# Patient Record
Sex: FEMALE | Race: AMERICAN INDIAN OR ALASKA NATIVE | NOT HISPANIC OR LATINO | URBAN - METROPOLITAN AREA
[De-identification: names, ages, dates, MRNs, and addresses within clinical notes are randomized per-mention and may not be internally consistent; named-entity substitution may affect disease eponyms.]

---

## 2022-07-21 ENCOUNTER — OUTPATIENT (OUTPATIENT)
Dept: OUTPATIENT SERVICES | Facility: HOSPITAL | Age: 32
LOS: 1 days | Discharge: HOME | End: 2022-07-21

## 2022-07-21 VITALS
DIASTOLIC BLOOD PRESSURE: 74 MMHG | HEART RATE: 66 BPM | WEIGHT: 158.73 LBS | SYSTOLIC BLOOD PRESSURE: 119 MMHG | HEIGHT: 65 IN | RESPIRATION RATE: 18 BRPM | TEMPERATURE: 97 F | OXYGEN SATURATION: 100 %

## 2022-07-21 DIAGNOSIS — Z98.891 HISTORY OF UTERINE SCAR FROM PREVIOUS SURGERY: Chronic | ICD-10-CM

## 2022-07-21 DIAGNOSIS — K80.20 CALCULUS OF GALLBLADDER WITHOUT CHOLECYSTITIS WITHOUT OBSTRUCTION: ICD-10-CM

## 2022-07-21 DIAGNOSIS — Z98.890 OTHER SPECIFIED POSTPROCEDURAL STATES: Chronic | ICD-10-CM

## 2022-07-21 DIAGNOSIS — Z98.51 TUBAL LIGATION STATUS: Chronic | ICD-10-CM

## 2022-07-21 DIAGNOSIS — Z01.818 ENCOUNTER FOR OTHER PREPROCEDURAL EXAMINATION: ICD-10-CM

## 2022-07-21 LAB
ALBUMIN SERPL ELPH-MCNC: 4.6 G/DL — SIGNIFICANT CHANGE UP (ref 3.5–5.2)
ALP SERPL-CCNC: 92 U/L — SIGNIFICANT CHANGE UP (ref 30–115)
ALT FLD-CCNC: 10 U/L — SIGNIFICANT CHANGE UP (ref 0–41)
ANION GAP SERPL CALC-SCNC: 9 MMOL/L — SIGNIFICANT CHANGE UP (ref 7–14)
APTT BLD: 32.6 SEC — SIGNIFICANT CHANGE UP (ref 27–39.2)
AST SERPL-CCNC: 13 U/L — SIGNIFICANT CHANGE UP (ref 0–41)
BASOPHILS # BLD AUTO: 0.06 K/UL — SIGNIFICANT CHANGE UP (ref 0–0.2)
BASOPHILS NFR BLD AUTO: 0.6 % — SIGNIFICANT CHANGE UP (ref 0–1)
BILIRUB SERPL-MCNC: <0.2 MG/DL — SIGNIFICANT CHANGE UP (ref 0.2–1.2)
BUN SERPL-MCNC: 16 MG/DL — SIGNIFICANT CHANGE UP (ref 10–20)
CALCIUM SERPL-MCNC: 9.5 MG/DL — SIGNIFICANT CHANGE UP (ref 8.5–10.1)
CHLORIDE SERPL-SCNC: 99 MMOL/L — SIGNIFICANT CHANGE UP (ref 98–110)
CO2 SERPL-SCNC: 27 MMOL/L — SIGNIFICANT CHANGE UP (ref 17–32)
CREAT SERPL-MCNC: 0.7 MG/DL — SIGNIFICANT CHANGE UP (ref 0.7–1.5)
EGFR: 118 ML/MIN/1.73M2 — SIGNIFICANT CHANGE UP
EOSINOPHIL # BLD AUTO: 0.24 K/UL — SIGNIFICANT CHANGE UP (ref 0–0.7)
EOSINOPHIL NFR BLD AUTO: 2.2 % — SIGNIFICANT CHANGE UP (ref 0–8)
GLUCOSE SERPL-MCNC: 86 MG/DL — SIGNIFICANT CHANGE UP (ref 70–99)
HCT VFR BLD CALC: 38.8 % — SIGNIFICANT CHANGE UP (ref 37–47)
HGB BLD-MCNC: 12.5 G/DL — SIGNIFICANT CHANGE UP (ref 12–16)
IMM GRANULOCYTES NFR BLD AUTO: 0.4 % — HIGH (ref 0.1–0.3)
INR BLD: 1.21 RATIO — SIGNIFICANT CHANGE UP (ref 0.65–1.3)
LYMPHOCYTES # BLD AUTO: 3.28 K/UL — SIGNIFICANT CHANGE UP (ref 1.2–3.4)
LYMPHOCYTES # BLD AUTO: 30.6 % — SIGNIFICANT CHANGE UP (ref 20.5–51.1)
MCHC RBC-ENTMCNC: 26.6 PG — LOW (ref 27–31)
MCHC RBC-ENTMCNC: 32.2 G/DL — SIGNIFICANT CHANGE UP (ref 32–37)
MCV RBC AUTO: 82.6 FL — SIGNIFICANT CHANGE UP (ref 81–99)
MONOCYTES # BLD AUTO: 0.72 K/UL — HIGH (ref 0.1–0.6)
MONOCYTES NFR BLD AUTO: 6.7 % — SIGNIFICANT CHANGE UP (ref 1.7–9.3)
NEUTROPHILS # BLD AUTO: 6.39 K/UL — SIGNIFICANT CHANGE UP (ref 1.4–6.5)
NEUTROPHILS NFR BLD AUTO: 59.5 % — SIGNIFICANT CHANGE UP (ref 42.2–75.2)
NRBC # BLD: 0 /100 WBCS — SIGNIFICANT CHANGE UP (ref 0–0)
PLATELET # BLD AUTO: 250 K/UL — SIGNIFICANT CHANGE UP (ref 130–400)
POTASSIUM SERPL-MCNC: 4.6 MMOL/L — SIGNIFICANT CHANGE UP (ref 3.5–5)
POTASSIUM SERPL-SCNC: 4.6 MMOL/L — SIGNIFICANT CHANGE UP (ref 3.5–5)
PROT SERPL-MCNC: 7.8 G/DL — SIGNIFICANT CHANGE UP (ref 6–8)
PROTHROM AB SERPL-ACNC: 13.9 SEC — HIGH (ref 9.95–12.87)
RBC # BLD: 4.7 M/UL — SIGNIFICANT CHANGE UP (ref 4.2–5.4)
RBC # FLD: 13.8 % — SIGNIFICANT CHANGE UP (ref 11.5–14.5)
SODIUM SERPL-SCNC: 135 MMOL/L — SIGNIFICANT CHANGE UP (ref 135–146)
WBC # BLD: 10.73 K/UL — SIGNIFICANT CHANGE UP (ref 4.8–10.8)
WBC # FLD AUTO: 10.73 K/UL — SIGNIFICANT CHANGE UP (ref 4.8–10.8)

## 2022-07-21 NOTE — H&P PST ADULT - HISTORY OF PRESENT ILLNESS
Pt denies cp palp uri cough dysuria or sob. ET: 1-2  FOS- denies SOB . PT denies any open wounds, drainage or rashes. hx of covid  -denies recent cough fever or infection. aware to self quarantine preop. all instructions reviewed.    scheduled for 7/27/22 lap nancy possible  open. hx abdominal discomfort x  3 years . increasing abdominal burning/aching x last 6 months. pain scale 9/10 daily   denies pain meds. resolves at times with decreases eating &  increases abdominal pain with eating larger meals.       As per patient, this is their complete medical and surgical history, including medications both prescribed or over the counter.  Patient verbalized understanding of instructions and was given the opportunity to ask questions and have them answered.  Patient denies any signs or symptoms of COVID 19 and denies contact with known positive individuals.  They have an appointment for repeat COVID testing pre-procedure and acknowledge its time and place.  They were instructed to quarantine pre-procedure, practice exposure control measures, continue to self-monitor and report any concerns to their proceduralist.  Anesthesia Alert  NO--Difficult Airway  NO--History of neck surgery or radiation  NO--Limited ROM of neck  NO--History of Malignant hyperthermia  NO--Personal or family history of Pseudocholinesterase deficiency.  NO--Prior Anesthesia Complication  NO--Latex Allergy  NO--Loose teeth  NO--History of Rheumatoid Arthritis  NO--RAPHAEL  NO--Bleeding risk  NO--Other_____

## 2022-07-21 NOTE — H&P PST ADULT - NSICDXPASTSURGICALHX_GEN_ALL_CORE_FT
PAST SURGICAL HISTORY:  History of esophagogastroduodenoscopy (EGD)     S/P      S/P tubal ligation

## 2022-07-26 NOTE — ASU PATIENT PROFILE, ADULT - PAIN SCALE PREFERRED, PROFILE
Aleisha Smith  [id number]  1962  57 y.o.  female    Referring Provider: Caitlyn Lanza APRN    Reason for  Visit:  Here for follow up after cardiac testing after initial   visit for shortness of breath, chest pain and palpitations  preoperative cardiovascular clearance under general anesthesia for Dr Castro right peroneal nerve decompression  This was performed successfully and now recovering  Cardiac workup test results as below     Subjective         Feels same overall as during last visit  No new events or complaints since last visit   Overall the patient feels no major change from baseline symptoms   Similar symptoms as during last visit      Chest pain (fullness or tightness) with exertion and rest , moderate substernal, pressure like. Lasts up to 1 hour   Started 6 months ago  Occurs once or twice a week  No associated diaphoresis    No associated nausea  Radiation to left side of neck below left jaw bone   Precipitated with exertion and also when heart rates drop    Relieved with rest  Not positional    No change with intake of food or antacids  No change with breathing  Moderate associated dyspnea       Moderate persistent palpitations  Frequent palpitations, once day or two lasting for less than 1 minute  No associated symptoms of dizziness, weakness, chest pain,  shortness of breath    Often before sleeping, cough may help  Very stressed due to mother's illness  Mild pedal edema especially after long driving  Chronic spine and foot pain      History of present illness:  Aleisha Smith is a 57 y.o. yo female with history of Essential Hypertension  Hyperlipidemia   who presents today for   Chief Complaint   Patient presents with   • Post-op     3 mo f/u s/p right peroneal nerve decompression with Dr. Castro   • Slow Heart Rate     recent testing   • Shortness of Breath     exertional   .    History  Past Medical History:   Diagnosis Date   • Anxiety    • Hyperlipidemia    • Hypertension    ,    Past Surgical History:   Procedure Laterality Date   • BACK SURGERY     •  SECTION     • EYE SURGERY     • FOOT FUSION     • OTHER SURGICAL HISTORY      NERVE DECOMPRESSION 2019   • TUBAL ABDOMINAL LIGATION     ,   Family History   Problem Relation Age of Onset   • Breast cancer Paternal Grandmother    • Heart disease Mother    ,   Social History     Tobacco Use   • Smoking status: Never Smoker   • Smokeless tobacco: Never Used   Substance Use Topics   • Alcohol use: Yes     Frequency: Never     Comment: OCC   • Drug use: No   ,     Medications  Current Outpatient Medications   Medication Sig Dispense Refill   • ALPRAZolam (XANAX) 0.5 MG tablet Take 0.5 mg by mouth At Night As Needed.  0   • escitalopram (LEXAPRO) 20 MG tablet Take 20 mg by mouth Daily As Needed.  1   • gabapentin (NEURONTIN) 100 MG capsule Take 100 mg by mouth 2 (Two) Times a Day.  1   • hydrochlorothiazide (MICROZIDE) 12.5 MG capsule Take 1 capsule by mouth Daily. 90 capsule 3   • lisinopril (PRINIVIL,ZESTRIL) 10 MG tablet Take 10 mg by mouth Daily.  1   • Loratadine 10 MG capsule Take 10 mg by mouth.     • Melatonin 10 MG tablet Take  by mouth.     • meloxicam (MOBIC) 15 MG tablet Take 15 mg by mouth.     • rosuvastatin (CRESTOR) 10 MG tablet Take 10 mg by mouth Daily.  1   • tiZANidine (ZANAFLEX) 4 MG tablet TAKE 1/4 TO 1 TABLET BY MOUTH EVERY 6-8 HOURS  2     No current facility-administered medications for this visit.        Allergies:  Patient has no known allergies.    Review of Systems  Review of Systems   Constitution: Positive for malaise/fatigue.   HENT: Negative.    Eyes: Negative.    Cardiovascular: Positive for chest pain, dyspnea on exertion and palpitations. Negative for claudication, cyanosis, irregular heartbeat, leg swelling, near-syncope, orthopnea, paroxysmal nocturnal dyspnea and syncope.   Respiratory: Negative.    Endocrine: Negative.    Hematologic/Lymphatic: Negative.    Skin: Negative.    Musculoskeletal:  "Positive for arthritis, back pain and joint pain.   Gastrointestinal: Negative for anorexia.   Genitourinary: Negative.    Neurological: Positive for weakness.   Psychiatric/Behavioral: Negative.        Objective     Physical Exam:  /83   Pulse 69   Ht 162.6 cm (64\")   Wt 94.3 kg (208 lb)   SpO2 99%   BMI 35.70 kg/m²     Physical Exam   Constitutional: She appears well-developed.   HENT:   Head: Normocephalic.   Neck: Normal carotid pulses and no JVD present. No tracheal tenderness present. Carotid bruit is not present. No tracheal deviation and no edema present.   Cardiovascular: Regular rhythm, normal heart sounds and normal pulses.   Pulmonary/Chest: Effort normal. No stridor.   Abdominal: Soft. She exhibits no distension. There is no hepatosplenomegaly. There is no tenderness.   Neurological: She is alert. She has normal strength. No cranial nerve deficit or sensory deficit.   Skin: Skin is warm.   Psychiatric: She has a normal mood and affect. Her speech is normal and behavior is normal.       Results Review:      Aleisha Smith   Holter monitor - 48 hour   Order# [id number]   Reading physician: Willian Mas MD Ordering physician: Willian Mas MD Study date: 19   Patient Information     Patient Name  Aleisha Smith MRN  [id number] Sex  Female  (Age)  1962 (57 y.o.)   Interpretation Summary     · Monitored for ~2 days  · The predominant rhythm noted during the testing period was sinus rhythm.  ·  7 premature ventricular contractions: PVC burden:  <0.1%   · 66  atrial premature contractions:  APC burden: <0.1%             · 4 runs of supraventricular tachycardia longest 8 beats at a maximum rate of 125 bpm  · No significant  ventricular tachy or alvin arrhythmia.  · No correlated arrhythmia  · No significant pauses above 3 seconds     Conclusion: Baseline rhythm is sinus.  No significant ectopy  4 runs of supraventricular tachycardia longest 8 beats at a maximum rate 125 bpm          "     Interpretation Summary        · Estimated EF = 55%.  · Left ventricular systolic function is normal.  · Low risk stress test for stress induced myocardial ischemia           Aleisha Smith   Echocardiogram   Order# [id number]   Reading physician: Willian Mas MD Ordering physician: Willian Mas MD Study date: 19   Patient Information     Patient Name  Aleisha Smith MRN  [id number] Sex  Female  (Age)  1962 (57 y.o.)   Sedation Narrator Report     Sedation Narrator Report   Interpretation Summary     · Estimated EF = 55%.  · Left ventricular systolic function is normal.  · Left ventricular diastolic dysfunction.  · No evidence of pulmonary hypertension is present.          Procedures    Assessment/Plan   Aleisha was seen today for post-op, slow heart rate and shortness of breath.    Diagnoses and all orders for this visit:    Pain of right lower extremity    Essential hypertension    Mixed hyperlipidemia    DYSON (dyspnea on exertion)  -     Full Pulmonary Function Test With Bronchodilator; Future    Anxiety and depression    Abnormal ECG    Palpitations    Chest tightness  -     CT Angiogram Coronary; Future           Plan         Check BP and heart rates twice daily at least 3x / week at home and bring a recording for me to review next visit  IF BP >135/85 call sooner      obstructive sleep apnea test tomorrow     Orders Placed This Encounter   Procedures   • CT Angiogram Coronary     Standing Status:   Future     Standing Expiration Date:   2020   • Full Pulmonary Function Test With Bronchodilator     Standing Status:   Future     Standing Expiration Date:   2020     Order Specific Question:   PFT Procedures to Be Performed     Answer:   Lung Volumes     Order Specific Question:   PFT Procedures to Be Performed     Answer:   Spirometry Pre & Post Bronchodilator     Order Specific Question:   With:     Answer:   DLCO     Order Specific Question:   Bronchodilator Type:     Answer:    albuterol (PROVENTIL) nebulizer solution 0.083% 2.5 mg/3 mL       ____________________________________________________________________________________________________________________________________________  Health maintenance and recommendations    Similar recommendations as last visit       Offered to give patient  a copy of my notes       Questions were encouraged, asked and answered to the patient's  understanding and satisfaction. Questions if any regarding current medications and side effects, need for refills and importance of compliance to medications stressed.    Reviewed available prior notes, consults, prior visits, laboratory findings, radiology and cardiology relevant reports. Updated chart as applicable. I have reviewed the patient's medical history in detail and updated the computerized patient record as relevant.      Updated patient regarding any new or relevant abnormalities on review of records or any new findings on physical exam. Mentioned to patient about purpose of visit and desirable health short and long term goals and objectives.    Primary to monitor CBC CMP Lipid panel and TSH as applicable    ___________________________________________________________________________________________________________________________________________          Return in about 6 months (around 6/26/2020).               numerical 0-10

## 2022-07-26 NOTE — ASU PATIENT PROFILE, ADULT - FALL HARM RISK - UNIVERSAL INTERVENTIONS
Bed in lowest position, wheels locked, appropriate side rails in place/Call bell, personal items and telephone in reach/Instruct patient to call for assistance before getting out of bed or chair/Non-slip footwear when patient is out of bed/[place] to call system/Physically safe environment - no spills, clutter or unnecessary equipment/Purposeful Proactive Rounding/Room/bathroom lighting operational, light cord in reach

## 2022-07-27 ENCOUNTER — TRANSCRIPTION ENCOUNTER (OUTPATIENT)
Age: 32
End: 2022-07-27

## 2022-07-27 ENCOUNTER — OUTPATIENT (OUTPATIENT)
Dept: OUTPATIENT SERVICES | Facility: HOSPITAL | Age: 32
LOS: 1 days | Discharge: HOME | End: 2022-07-27

## 2022-07-27 ENCOUNTER — RESULT REVIEW (OUTPATIENT)
Age: 32
End: 2022-07-27

## 2022-07-27 VITALS
WEIGHT: 154.98 LBS | DIASTOLIC BLOOD PRESSURE: 67 MMHG | RESPIRATION RATE: 18 BRPM | SYSTOLIC BLOOD PRESSURE: 98 MMHG | OXYGEN SATURATION: 100 % | HEART RATE: 67 BPM | TEMPERATURE: 98 F | HEIGHT: 65 IN

## 2022-07-27 VITALS
OXYGEN SATURATION: 100 % | HEART RATE: 58 BPM | SYSTOLIC BLOOD PRESSURE: 135 MMHG | DIASTOLIC BLOOD PRESSURE: 65 MMHG | TEMPERATURE: 98 F

## 2022-07-27 DIAGNOSIS — Z98.890 OTHER SPECIFIED POSTPROCEDURAL STATES: Chronic | ICD-10-CM

## 2022-07-27 DIAGNOSIS — Z98.891 HISTORY OF UTERINE SCAR FROM PREVIOUS SURGERY: Chronic | ICD-10-CM

## 2022-07-27 DIAGNOSIS — Z98.51 TUBAL LIGATION STATUS: Chronic | ICD-10-CM

## 2022-07-27 PROCEDURE — 88304 TISSUE EXAM BY PATHOLOGIST: CPT | Mod: 26

## 2022-07-27 RX ORDER — SODIUM CHLORIDE 9 MG/ML
1000 INJECTION, SOLUTION INTRAVENOUS
Refills: 0 | Status: DISCONTINUED | OUTPATIENT
Start: 2022-07-27 | End: 2022-08-10

## 2022-07-27 RX ORDER — MORPHINE SULFATE 50 MG/1
2 CAPSULE, EXTENDED RELEASE ORAL
Refills: 0 | Status: DISCONTINUED | OUTPATIENT
Start: 2022-07-27 | End: 2022-07-27

## 2022-07-27 RX ORDER — ONDANSETRON 8 MG/1
4 TABLET, FILM COATED ORAL ONCE
Refills: 0 | Status: DISCONTINUED | OUTPATIENT
Start: 2022-07-27 | End: 2022-08-10

## 2022-07-27 RX ORDER — OXYCODONE AND ACETAMINOPHEN 5; 325 MG/1; MG/1
1 TABLET ORAL ONCE
Refills: 0 | Status: DISCONTINUED | OUTPATIENT
Start: 2022-07-27 | End: 2022-07-27

## 2022-07-27 RX ORDER — HYDROMORPHONE HYDROCHLORIDE 2 MG/ML
0.5 INJECTION INTRAMUSCULAR; INTRAVENOUS; SUBCUTANEOUS
Refills: 0 | Status: DISCONTINUED | OUTPATIENT
Start: 2022-07-27 | End: 2022-07-27

## 2022-07-27 RX ADMIN — HYDROMORPHONE HYDROCHLORIDE 0.5 MILLIGRAM(S): 2 INJECTION INTRAMUSCULAR; INTRAVENOUS; SUBCUTANEOUS at 09:55

## 2022-07-27 RX ADMIN — HYDROMORPHONE HYDROCHLORIDE 0.5 MILLIGRAM(S): 2 INJECTION INTRAMUSCULAR; INTRAVENOUS; SUBCUTANEOUS at 10:15

## 2022-07-27 RX ADMIN — HYDROMORPHONE HYDROCHLORIDE 0.5 MILLIGRAM(S): 2 INJECTION INTRAMUSCULAR; INTRAVENOUS; SUBCUTANEOUS at 10:10

## 2022-07-27 NOTE — BRIEF OPERATIVE NOTE - OPERATION/FINDINGS
Long tortuous gallbladder, cystic duct clipped and sharply divided. Branches of cystic artery were clipped and divided however, main cystic artery was able to be preserved. (3) adequate

## 2022-07-27 NOTE — ASU DISCHARGE PLAN (ADULT/PEDIATRIC) - CARE PROVIDER_API CALL
Rene Hernandez)  Surgery  [address] [place] [address]  Phone: [phone]  Fax: [phone]  Follow Up Time:

## 2022-07-27 NOTE — ASU DISCHARGE PLAN (ADULT/PEDIATRIC) - NS MD DC FALL RISK RISK
For information on Fall & Injury Prevention, visit: [email or web address] OR  [email or web address]/[email or web address] OR  https://www.cdc.gov/steadi/patient.html

## 2022-07-27 NOTE — ASU DISCHARGE PLAN (ADULT/PEDIATRIC) - ASU DC SPECIAL INSTRUCTIONSFT
Do not remove surgical dressing, as they will fall off on their own or will be removed in clinic on your follow up appointment. You may shower, however, no bathing/swimming/hot tubs (or any submersion in water) for at least 4 weeks. No lifting anything > 20 pounds for at least 6 weeks to prevent hernias.     Please call Dr. Hernandez's office to schedule follow up appointment for Tuesday,08/02/2022 .

## 2022-07-28 LAB — SURGICAL PATHOLOGY STUDY: SIGNIFICANT CHANGE UP

## 2022-08-03 DIAGNOSIS — Z86.16 PERSONAL HISTORY OF COVID-19: ICD-10-CM

## 2022-08-03 DIAGNOSIS — Z98.51 TUBAL LIGATION STATUS: ICD-10-CM

## 2022-08-03 DIAGNOSIS — K80.10 CALCULUS OF GALLBLADDER WITH CHRONIC CHOLECYSTITIS WITHOUT OBSTRUCTION: ICD-10-CM

## 2022-11-22 PROBLEM — Z87.19 PERSONAL HISTORY OF OTHER DISEASES OF THE DIGESTIVE SYSTEM: Chronic | Status: ACTIVE | Noted: 2022-07-21

## 2022-11-22 PROBLEM — U07.1 COVID-19: Chronic | Status: ACTIVE | Noted: 2022-07-21

## 2022-11-28 ENCOUNTER — APPOINTMENT (OUTPATIENT)
Dept: CARDIOLOGY | Facility: CLINIC | Age: 32
End: 2022-11-28

## 2022-11-28 VITALS — WEIGHT: 162.31 LBS | HEIGHT: 65 IN | BODY MASS INDEX: 27.04 KG/M2

## 2022-11-28 PROCEDURE — 93000 ELECTROCARDIOGRAM COMPLETE: CPT

## 2022-11-28 PROCEDURE — 99203 OFFICE O/P NEW LOW 30 MIN: CPT | Mod: 25

## 2022-11-28 NOTE — PHYSICAL EXAM
[Normal Conjunctiva] : normal conjunctiva [Normal Venous Pressure] : normal venous pressure [Normal S1, S2] : normal S1, S2 [Clear Lung Fields] : clear lung fields [Soft] : abdomen soft [No Edema] : no edema

## 2023-02-16 NOTE — DISCUSSION/SUMMARY
[EKG obtained to assist in diagnosis and management of assessed problem(s)] : EKG obtained to assist in diagnosis and management of assessed problem(s) [FreeTextEntry1] : Cp atypical does not seem cardiac \par but very low HDl \par get ETT get 2 d echo \par f/u after testing \par ekg nsr normal ecg \par get repeat lipids fasting to see TG as ate when 313

## 2023-02-16 NOTE — HISTORY OF PRESENT ILLNESS
[FreeTextEntry1] : pt with no family h/o, GERD, comes for recurrent cp, pt had covid in 2020 november and since then having sharp pain off and on, pt says sometimes severe pain and radiating to pain in arm and back, pt says sometimes pain goes to left arm. \par pt says with cp no sob, but has diaphoresis at times, no n/v \par pt says went to urgent care in the past and told it was costochondritis. \par pt does not exercise \par tG: high 313 but ate prior to it\par HDL 22 \par *unable to obtain echo denied by insurance

## 2023-03-13 ENCOUNTER — APPOINTMENT (OUTPATIENT)
Dept: CARDIOLOGY | Facility: CLINIC | Age: 33
End: 2023-03-13

## 2023-03-20 ENCOUNTER — APPOINTMENT (OUTPATIENT)
Dept: CARDIOLOGY | Facility: CLINIC | Age: 33
End: 2023-03-20
Payer: MEDICAID

## 2023-03-20 VITALS — HEIGHT: 65 IN | BODY MASS INDEX: 26.51 KG/M2 | WEIGHT: 159.13 LBS

## 2023-03-20 VITALS — HEART RATE: 60 BPM | DIASTOLIC BLOOD PRESSURE: 70 MMHG | SYSTOLIC BLOOD PRESSURE: 110 MMHG

## 2023-03-20 PROCEDURE — 99213 OFFICE O/P EST LOW 20 MIN: CPT

## 2023-03-20 NOTE — HISTORY OF PRESENT ILLNESS
[FreeTextEntry1] : pt with no family h/o, GERD, comes for recurrent cp, pt had covid in 2020 November and since then having sharp pain off and on, pt says sometimes severe pain and radiating to pain in arm and back, pt says sometimes pain goes to left arm. \par pt says with cp no sob, but has diaphoresis at times, no n/v \par pt says went to urgent care in the past and told it was costochondritis. \par pt does not exercise \par tG: high 313 but ate prior to it\par HDL 22 \par *unable to obtain echo denied by insurance\par \par 3/20/23:\par Pt still c/o occasional stabbing like cp that radiates to the arm and back. denies sob. pt still not exercising at this time. pt was not fasting for previous blood work.

## 2023-03-20 NOTE — DISCUSSION/SUMMARY
[FreeTextEntry1] : Cp atypical does not seem cardiac \par but very low HDl \par get ETT get 2 d echo \par f/u after testing \par ekg nsr normal ecg \par get repeat lipids fasting to see TG as ate when 313 \par \par 3/20/23:\par Get stress test \par repeat labs\par f/u after stress test

## 2023-03-28 ENCOUNTER — APPOINTMENT (OUTPATIENT)
Dept: CARDIOLOGY | Facility: CLINIC | Age: 33
End: 2023-03-28
Payer: MEDICAID

## 2023-03-28 PROCEDURE — 93015 CV STRESS TEST SUPVJ I&R: CPT

## 2023-04-17 ENCOUNTER — APPOINTMENT (OUTPATIENT)
Dept: CARDIOLOGY | Facility: CLINIC | Age: 33
End: 2023-04-17
Payer: MEDICAID

## 2023-04-17 VITALS — DIASTOLIC BLOOD PRESSURE: 70 MMHG | HEART RATE: 60 BPM | SYSTOLIC BLOOD PRESSURE: 112 MMHG

## 2023-04-17 VITALS — HEIGHT: 65 IN | BODY MASS INDEX: 26.66 KG/M2 | WEIGHT: 160 LBS

## 2023-04-17 PROCEDURE — 99213 OFFICE O/P EST LOW 20 MIN: CPT

## 2023-04-17 NOTE — HISTORY OF PRESENT ILLNESS
[FreeTextEntry1] : pt with no family h/o, GERD, CHRONIC LOW HDL, comes for recurrent cp, pt had covid in 2020 November and since then having sharp pain off and on, pt says sometimes severe pain and radiating to pain in arm and back, pt says sometimes pain goes to left arm. \par pt says with cp no sob, but has diaphoresis at times, no n/v \par pt says went to urgent care in the past and told it was costochondritis. \par pt does not exercise \par tG: high 313 but ate prior to it\par HDL 22 \par *unable to obtain echo denied by insurance\par \par 3/20/23:\par Pt still c/o occasional stabbing like cp that radiates to the arm and back. denies sob. pt still not exercising at this time. pt was not fasting for previous blood work. \par \par 4/17/23: \par ETT: dts 9 minutes 10.3 mets, negative for ischemia. no cp during stress test. \par , HDL 27, LDl 49 \par pt still having some chest pain off and on but rare and radiates to left arm. pt does not exercise only going up and down steps. \par insurance did not approve 2 d echo.

## 2023-04-17 NOTE — DISCUSSION/SUMMARY
[FreeTextEntry1] : Cp atypical does not seem cardiac \par but very low HDl \par get ETT get 2 d echo \par f/u after testing \par ekg nsr normal ecg \par get repeat lipids fasting to see TG as ate when 313 \par \par 3/20/23:\par Get stress test \par repeat labs\par f/u after stress test \par \par 4/17/23: pt cp ? vasospastic, ETT negative for ischemia and 5 year risk MACE is 0.25% \par try ranexa to see if it helps chest pain \par exercise 30 min 5 times/week.

## 2023-10-10 PROBLEM — R07.89 ATYPICAL CHEST PAIN: Status: ACTIVE | Noted: 2022-11-28

## 2023-10-10 PROBLEM — Z00.00 ENCOUNTER FOR PREVENTIVE HEALTH EXAMINATION: Status: ACTIVE | Noted: 2022-11-22

## 2023-10-11 ENCOUNTER — APPOINTMENT (OUTPATIENT)
Dept: CARDIOLOGY | Facility: CLINIC | Age: 33
End: 2023-10-11
Payer: MEDICAID

## 2023-10-11 VITALS — BODY MASS INDEX: 27.68 KG/M2 | HEIGHT: 65 IN | WEIGHT: 166.13 LBS

## 2023-10-11 VITALS — DIASTOLIC BLOOD PRESSURE: 70 MMHG | SYSTOLIC BLOOD PRESSURE: 100 MMHG | HEART RATE: 65 BPM

## 2023-10-11 DIAGNOSIS — R07.89 OTHER CHEST PAIN: ICD-10-CM

## 2023-10-11 DIAGNOSIS — Z00.00 ENCOUNTER FOR GENERAL ADULT MEDICAL EXAMINATION W/OUT ABNORMAL FINDINGS: ICD-10-CM

## 2023-10-11 PROCEDURE — 99214 OFFICE O/P EST MOD 30 MIN: CPT

## 2023-10-11 RX ORDER — ROSUVASTATIN CALCIUM 5 MG/1
5 TABLET, FILM COATED ORAL DAILY
Qty: 90 | Refills: 3 | Status: ACTIVE | COMMUNITY
Start: 2023-10-11 | End: 1900-01-01

## 2024-03-29 ENCOUNTER — APPOINTMENT (OUTPATIENT)
Dept: CARDIOLOGY | Facility: CLINIC | Age: 34
End: 2024-03-29
Payer: MEDICAID

## 2024-03-29 VITALS — HEART RATE: 70 BPM | DIASTOLIC BLOOD PRESSURE: 60 MMHG | SYSTOLIC BLOOD PRESSURE: 98 MMHG

## 2024-03-29 VITALS — WEIGHT: 154 LBS | BODY MASS INDEX: 25.66 KG/M2 | HEIGHT: 65 IN

## 2024-03-29 VITALS — SYSTOLIC BLOOD PRESSURE: 90 MMHG | DIASTOLIC BLOOD PRESSURE: 60 MMHG

## 2024-03-29 DIAGNOSIS — I20.1 ANGINA PECTORIS WITH DOCUMENTED SPASM: ICD-10-CM

## 2024-03-29 DIAGNOSIS — E88.810 METABOLIC SYNDROME: ICD-10-CM

## 2024-03-29 DIAGNOSIS — R73.03 PREDIABETES.: ICD-10-CM

## 2024-03-29 DIAGNOSIS — E78.2 MIXED HYPERLIPIDEMIA: ICD-10-CM

## 2024-03-29 PROCEDURE — 93000 ELECTROCARDIOGRAM COMPLETE: CPT

## 2024-03-29 PROCEDURE — 99214 OFFICE O/P EST MOD 30 MIN: CPT | Mod: 25

## 2024-03-29 RX ORDER — RANOLAZINE 1000 MG/1
1000 TABLET, EXTENDED RELEASE ORAL
Qty: 180 | Refills: 3 | Status: ACTIVE | COMMUNITY
Start: 2023-04-17 | End: 1900-01-01

## 2024-03-29 NOTE — DISCUSSION/SUMMARY
[FreeTextEntry1] : very low HDL increase exercise  pt cp ? vasospastic, ETT negative for ischemia and 5 year risk MACE is 0.25%  continue ranexa as helping and possilbe vasospastic angina  elevated hscrp  cont rosuvastatin asprin 81 mg LDL 26 hscrp improved so will constinue rosuvastatin for now  bloodwork  f/u in 6 months   [EKG obtained to assist in diagnosis and management of assessed problem(s)] : EKG obtained to assist in diagnosis and management of assessed problem(s)

## 2024-03-29 NOTE — HISTORY OF PRESENT ILLNESS
[FreeTextEntry1] : pt with Predm, GERD, CHRONIC LOW HDL, RECURRENT ATYPICAL CP, ? vasospastic angina, metabolic syndrome, ELEVATED CRP pt has had covid in 2020 and since then having sharp chest pain off and on, T HDL 22 in 2023   3/20/23: Pt still c/o occasional stabbing like cp that radiates to the arm and back. denies sob. pt still not exercising at this time. pt was not fasting for previous blood work.   23:  ETT: dts 9 minutes 10.3 mets, negative for ischemia. no cp during stress test.  , HDL 27, LDL 49  pt still having some chest pain off and on but rare and radiates to left arm. pt does not HAPPEN WITH exercise only going up and down steps.  insurance did not approve 2 d echo.   10/11/23: T, HDL: 32, LDL: 52, CRP: 9.41, APOB: 51, LPa: 28.7  Patient bought stationary bike and does it every day and does it for different times every day sometimes 10 minutes.  pt taking ranolazine and says now having rare chest pain prevented by ranolazine  3/29/24: 3/25/24: T from 161, HDL: 32, LDL: 26, A1C: 5.7, BNP: 66 hsCRP: 9 to 4.21 Patient has low bp and drops with standing but denies dizziness.  Patient says [facility] ran out of Ranolazine and patient hasnt taken medication in 3 months patient having chest pain will send to [facility].  Patient says not using stationary bike because patient is fasting. Patient c/o sob with talking fast. Patient having palpitations when walking sometimes and only last a few seconds or minutes.

## 2024-07-19 ENCOUNTER — APPOINTMENT (OUTPATIENT)
Dept: CARDIOLOGY | Facility: CLINIC | Age: 34
End: 2024-07-19
Payer: MEDICAID

## 2024-07-19 VITALS — BODY MASS INDEX: 25.66 KG/M2 | HEIGHT: 65 IN | WEIGHT: 154 LBS

## 2024-07-19 VITALS — HEART RATE: 74 BPM | SYSTOLIC BLOOD PRESSURE: 122 MMHG | DIASTOLIC BLOOD PRESSURE: 78 MMHG

## 2024-07-19 DIAGNOSIS — E78.2 MIXED HYPERLIPIDEMIA: ICD-10-CM

## 2024-07-19 DIAGNOSIS — I20.1 ANGINA PECTORIS WITH DOCUMENTED SPASM: ICD-10-CM

## 2024-07-19 DIAGNOSIS — E88.810 METABOLIC SYNDROME: ICD-10-CM

## 2024-07-19 DIAGNOSIS — R73.03 PREDIABETES.: ICD-10-CM

## 2024-07-19 PROCEDURE — G2211 COMPLEX E/M VISIT ADD ON: CPT | Mod: NC,1L

## 2024-07-19 PROCEDURE — 99214 OFFICE O/P EST MOD 30 MIN: CPT

## 2024-11-01 ENCOUNTER — NON-APPOINTMENT (OUTPATIENT)
Age: 34
End: 2024-11-01

## 2024-11-01 ENCOUNTER — APPOINTMENT (OUTPATIENT)
Dept: CARDIOLOGY | Facility: CLINIC | Age: 34
End: 2024-11-01
Payer: MEDICAID

## 2024-11-01 VITALS
SYSTOLIC BLOOD PRESSURE: 118 MMHG | HEART RATE: 88 BPM | HEIGHT: 65 IN | DIASTOLIC BLOOD PRESSURE: 74 MMHG | WEIGHT: 155 LBS | OXYGEN SATURATION: 99 % | BODY MASS INDEX: 25.83 KG/M2

## 2024-11-01 DIAGNOSIS — I20.1 ANGINA PECTORIS WITH DOCUMENTED SPASM: ICD-10-CM

## 2024-11-01 DIAGNOSIS — E78.2 MIXED HYPERLIPIDEMIA: ICD-10-CM

## 2024-11-01 DIAGNOSIS — R73.03 PREDIABETES.: ICD-10-CM

## 2024-11-01 DIAGNOSIS — R55 SYNCOPE AND COLLAPSE: ICD-10-CM

## 2024-11-01 DIAGNOSIS — E88.810 METABOLIC SYNDROME: ICD-10-CM

## 2024-11-01 PROCEDURE — G2211 COMPLEX E/M VISIT ADD ON: CPT | Mod: NC

## 2024-11-01 PROCEDURE — 99214 OFFICE O/P EST MOD 30 MIN: CPT

## 2024-11-01 RX ORDER — ISOSORBIDE MONONITRATE 60 MG/1
60 TABLET, EXTENDED RELEASE ORAL
Qty: 90 | Refills: 3 | Status: ACTIVE | COMMUNITY
Start: 2024-11-01 | End: 1900-01-01

## 2024-11-12 ENCOUNTER — APPOINTMENT (OUTPATIENT)
Dept: CARDIOLOGY | Facility: CLINIC | Age: 34
End: 2024-11-12

## 2025-02-24 ENCOUNTER — APPOINTMENT (OUTPATIENT)
Dept: CARDIOLOGY | Facility: CLINIC | Age: 35
End: 2025-02-24

## 2025-03-07 ENCOUNTER — APPOINTMENT (OUTPATIENT)
Dept: CARDIOLOGY | Facility: CLINIC | Age: 35
End: 2025-03-07